# Patient Record
Sex: MALE | Race: OTHER
[De-identification: names, ages, dates, MRNs, and addresses within clinical notes are randomized per-mention and may not be internally consistent; named-entity substitution may affect disease eponyms.]

---

## 2022-08-01 ENCOUNTER — HOSPITAL ENCOUNTER (INPATIENT)
Dept: HOSPITAL 93 - ER | Age: 67
LOS: 7 days | Discharge: HOME | DRG: 191 | End: 2022-08-08
Attending: INTERNAL MEDICINE | Admitting: INTERNAL MEDICINE
Payer: COMMERCIAL

## 2022-08-01 VITALS — WEIGHT: 220 LBS | BODY MASS INDEX: 35.36 KG/M2 | HEIGHT: 66 IN

## 2022-08-01 DIAGNOSIS — D72.829: ICD-10-CM

## 2022-08-01 DIAGNOSIS — F10.29: ICD-10-CM

## 2022-08-01 DIAGNOSIS — K13.3: ICD-10-CM

## 2022-08-01 DIAGNOSIS — K70.9: ICD-10-CM

## 2022-08-01 DIAGNOSIS — F17.208: ICD-10-CM

## 2022-08-01 DIAGNOSIS — U07.0: ICD-10-CM

## 2022-08-01 DIAGNOSIS — J44.1: Primary | ICD-10-CM

## 2022-08-01 PROCEDURE — BW24ZZZ COMPUTERIZED TOMOGRAPHY (CT SCAN) OF CHEST AND ABDOMEN: ICD-10-PCS | Performed by: GENERAL PRACTICE

## 2022-08-01 PROCEDURE — 5A0955A ASSISTANCE WITH RESPIRATORY VENTILATION, GREATER THAN 96 CONSECUTIVE HOURS, HIGH NASAL FLOW/VELOCITY: ICD-10-PCS | Performed by: GENERAL PRACTICE

## 2022-08-01 NOTE — NUR
SE ORIENTA AL PACIENTE SOBRE EL TX. SE EXTRAEN MUESTRAS DE ODALYS BAJO MEDIDAS
ASEPTICAS SE ROTULAN Y ENVIAN AL LABORATORIO. SE CANALIZA Y ADMINISTRAN
MEDICAMENTOS LYNN ORDEN MEDICA. SE NOTIFICAN TERAPIAS Y ABGS

## 2022-08-01 NOTE — NUR
SE RECIBE PT MASCULINO DE 66 ANOS ALERTA Y ORIENTADO X3 QUIEN REFEIRE
DIFICULTAD PARA RESPIRAR. SE MONITOREAN S/V Y PT OBTIENE SPO2 EN 92%. SE
UBICA PTE EN A/U.

## 2022-08-02 PROCEDURE — 4A12X45 MONITORING OF CARDIAC ELECTRICAL ACTIVITY, AMBULATORY, EXTERNAL APPROACH: ICD-10-PCS | Performed by: INTERNAL MEDICINE

## 2022-08-02 PROCEDURE — B54DZZZ ULTRASONOGRAPHY OF BILATERAL LOWER EXTREMITY VEINS: ICD-10-PCS | Performed by: GENERAL PRACTICE

## 2022-08-02 PROCEDURE — BW2410Z COMPUTERIZED TOMOGRAPHY (CT SCAN) OF CHEST AND ABDOMEN USING LOW OSMOLAR CONTRAST, UNENHANCED AND ENHANCED: ICD-10-PCS | Performed by: GENERAL PRACTICE

## 2022-09-02 ENCOUNTER — HOSPITAL ENCOUNTER (INPATIENT)
Dept: HOSPITAL 93 - ER | Age: 67
LOS: 7 days | Discharge: HOME | DRG: 191 | End: 2022-09-09
Attending: INTERNAL MEDICINE | Admitting: INTERNAL MEDICINE
Payer: COMMERCIAL

## 2022-09-02 VITALS — BODY MASS INDEX: 24.73 KG/M2 | HEIGHT: 69 IN | WEIGHT: 167 LBS

## 2022-09-02 DIAGNOSIS — U07.0: ICD-10-CM

## 2022-09-02 DIAGNOSIS — C22.0: ICD-10-CM

## 2022-09-02 DIAGNOSIS — J44.1: Primary | ICD-10-CM

## 2022-09-02 DIAGNOSIS — E87.1: ICD-10-CM

## 2022-09-02 DIAGNOSIS — F12.20: ICD-10-CM

## 2022-09-02 DIAGNOSIS — K76.9: ICD-10-CM

## 2022-09-02 DIAGNOSIS — Z71.51: ICD-10-CM

## 2022-09-02 DIAGNOSIS — F10.20: ICD-10-CM

## 2022-09-02 DIAGNOSIS — F17.290: ICD-10-CM

## 2022-09-03 PROCEDURE — 3E0F7SF INTRODUCTION OF OTHER GAS INTO RESPIRATORY TRACT, VIA NATURAL OR ARTIFICIAL OPENING: ICD-10-PCS | Performed by: INTERNAL MEDICINE

## 2022-09-06 PROCEDURE — BB2410Z COMPUTERIZED TOMOGRAPHY (CT SCAN) OF BILATERAL LUNGS USING LOW OSMOLAR CONTRAST, UNENHANCED AND ENHANCED: ICD-10-PCS | Performed by: INTERNAL MEDICINE

## 2022-09-08 PROCEDURE — BW2110Z COMPUTERIZED TOMOGRAPHY (CT SCAN) OF ABDOMEN AND PELVIS USING LOW OSMOLAR CONTRAST, UNENHANCED AND ENHANCED: ICD-10-PCS | Performed by: INTERNAL MEDICINE
